# Patient Record
Sex: MALE | Race: WHITE
[De-identification: names, ages, dates, MRNs, and addresses within clinical notes are randomized per-mention and may not be internally consistent; named-entity substitution may affect disease eponyms.]

---

## 2017-09-25 NOTE — EDPHY
H & P


Stated Complaint: Felt sharp pain in back of head last night;now has typical 

migraine





- Personal History


Current Tetanus Diphtheria and Acellular Pertussis (TDAP): Yes





- Medical/Surgical History


Other PMH: depression/anxiety/schizo.  migraines





- Social History


Smoking Status: Current every day smoker


Time Seen by Provider: 09/25/17 19:22


HPI/ROS: 





CHIEF COMPLAINT: migraine-like headache  





HISTORY OF PRESENT ILLNESS:  A 27-year-old male history of chronic intermittent 

headaches describes a doing yoga last evening, was coming out of position when 

he felt sharp stabbing sudden onset left occipital headache , not described 

this really is thunderclap definitely described as sudden onset.  He has been 

experiencing left-sided headache ever since with positive photophobia and 

nausea.  States that it feels definitely different than his usual chronic 

headaches.No gait instability.  No slurred speech.  No head trauma.





PRIMARY CARE PROVIDER:  





REVIEW OF SYSTEMS:


A ten point review of systems was performed and is negative with the exception 

of the items mentioned in the HPI








PAST MEDICAL & SURGICAL  HISTORY:  intermittent chronic headaches





SOCIAL HISTORY: no drug use    











************


PHYSICAL EXAM





(Prior to examination, patient consented to physical exam, hands were washed 

and my usual and customary physical exam procedures followed)


1) GENERAL: Well-developed, well-nourished, alert and oriented.  Appears to be 

in no acute distress.


2) HEAD: Normocephalic, atraumatic


3) HEENT: Pupils equal, round, reactive to light bilaterally.  Sclera 

anicteric.  Nasopharynx, oropharynx, clear, no lesions.  Ears bilaterally with 

normal tympanic membranes.


4) NECK: Full range of motion, no meningeal signs.


5) LUNGS: Clear auscultation bilaterally, no wheezes, no rhonchi, no 

retractions.   


6) HEART: Regular rate and rhythm, no murmur, no heave, no gallop.


7) ABDOMEN: No guarding, no rebound, no focal tenderness, negative McBurney's, 

negative Gongora's, negative Rovsing's, negative peritoneal sign,


8) MUSCULOSKELETAL: Moving all extremities, no focal areas of tenderness, no 

obvious trauma.  No peripheral edema or discoloration.


9) BACK: No CVA tenderness, no midline vertebral tenderness, no fluctuance, no 

step-off, no obvious trauma, no visual or palpable abnormality. 


10) SKIN: No rash, no petechiae. 


11) Psychiatric:  Patient is oriented X 3, there is no agitation.


12) NEURO: Awake, alert, and oriented to person, place and time.  Answers 

questions appropriately.  There were no obvious focal neurologic abnormalities.

  No cerebellar dysfunction.  Cranial nerves 2 through to 12 intact.  Normal 

steady gait.  Upper and lower extremities bilaterally with strength 5 / 5, 

reflexes 2+.








***************





DIFFERENTIAL DIAGNOSIS:  in no particular order, including but not limited to 

subarachnoid hemorrhage, migraine headache, tension headache and infectious 

causes such as meningitis, pharyngitis and sinusitis.  The patient understands 

that this diagnosis is provisional and can never be 100% accurate.  Usual and 

customary warnings were given concerning the clinical impression and all the 

patient's questions were answered.  The patient was instructed to return to the 

emergency department should her symptoms worsen or return, or develop any new 

symptoms, otherwise to followup as directed in discharge instructions. This is 

a partial list of diagnoses considered. These considerations are based on 

history, physical exam, past history and reassessment. (STEPHANIA Willard)


Constitutional: 


 Initial Vital Signs











Temperature (C)  37.1 C   09/25/17 18:52


 


Heart Rate  96   09/25/17 18:52


 


Respiratory Rate  18   09/25/17 18:52


 


Blood Pressure  132/68 H  09/25/17 18:52


 


O2 Sat (%)  96   09/25/17 18:52








 











O2 Delivery Mode               Room Air














Allergies/Adverse Reactions: 


 





No Known Allergies Allergy (Unverified 09/25/17 18:49)


 








Home Medications: 














 Medication  Instructions  Recorded


 


Benztropine Mesylate 0.5 mg PO 09/25/17


 


FLUoxetine [PROzac] 10 mg PO 09/25/17


 


Ranitidine HCl [Zantac] 150 mg PO 09/25/17


 


Topiramate [Topiramate ER] 25 mg PO 09/25/17


 


clonIDINE [Catapres (*)] 0.1 mg PO 09/25/17


 


risperiDONE [RisperDAL] 1 mg PO 09/25/17


 


traZODone [traZODONE 50MG (*)] 50 mg PO 09/25/17














Medical Decision Making


ED Course/Re-evaluation: 





I did not see this patient while he was in the emergency department.  However 

his care was discussed with the PA while the patient was in the department.  I 

agree with treatment plan and management (Itz Pearce)





9:50 p.m.:  Re-evaluation, is feeling improvement, requests dose of analgesia, 

Toradol would be administered.  Discussed his imaging results.  Discussed 

limitations of imaging.  Patient discussed his sudden onset headache, 

possibility of subarachnoid hemorrhage and recommended lumbar puncture.  He 

declines this.  States that he is feeling improvement and primarily want to 

come to the CT scan as he was concerned about underlying pathology.  He is more 

than welcome to return to the emergency department at any point. (STEPHANIA Willard)





- Data Points


Medications Given: 


 








Discontinued Medications





Ketorolac Tromethamine (Toradol)  30 mg IVP EDNOW ONE


   Stop: 09/25/17 21:51


   Last Admin: 09/25/17 22:03 Dose:  30 mg








Departure





- Departure


Disposition: Home, Routine, Self-Care


Clinical Impression: 


 Headache





Condition: Good


Instructions:  Acute Headache (ED)


Additional Instructions: 


THANK YOU FOR YOUR VISIT TO OUR EMERGENCY DEPARTMENT (ED).  YOU WERE SEEN TODAY 

BECAUSE OF A HEADACHE.  YOU MAY HAVE HAD LAB TESTS, A CT SCAN, MRI OR EVEN A 

LUMBAR PUNCTURE (COMMONLY REFERRED TO AS A SPINAL TAP).   WE CANNOT ALWAYS FIND 

THE EXACT CAUSE OF YOUR SYMPTOMS DURING YOUR VISIT TO THE ED.  PLEASE FOLLOW UP 

WITH YOUR DOCTOR WITHIN 24 HOURS TO BE RECHECKED.  RETURN TO THE ED IMMEDIATELY 

IF YOUR HEADACHE WORSENS, IF YOU DEVELOP A FEVER, NECK PAIN OR NECK STIFFNESS, 

OR IF YOU BECOME CONFUSED OR ABNORMALLY DROWSY.


Referrals: 


Baljeet Carnes MD [Medical Doctor] - 2-3 days, call for appt.

## 2019-03-19 ENCOUNTER — HOSPITAL ENCOUNTER (INPATIENT)
Dept: HOSPITAL 80 - FED | Age: 29
LOS: 3 days | Discharge: HOME | DRG: 885 | End: 2019-03-22
Attending: PSYCHIATRY & NEUROLOGY | Admitting: PSYCHIATRY & NEUROLOGY
Payer: COMMERCIAL

## 2019-03-19 DIAGNOSIS — F41.9: ICD-10-CM

## 2019-03-19 DIAGNOSIS — F10.20: ICD-10-CM

## 2019-03-19 DIAGNOSIS — F33.9: Primary | ICD-10-CM

## 2019-03-19 DIAGNOSIS — F17.210: ICD-10-CM

## 2019-03-19 DIAGNOSIS — F43.10: ICD-10-CM

## 2019-03-19 LAB — PLATELET # BLD: 230 10^3/UL (ref 150–400)

## 2019-03-19 PROCEDURE — G0480 DRUG TEST DEF 1-7 CLASSES: HCPCS

## 2019-03-19 NOTE — EDPHY
H & P


Smoking Status: Current every day smoker


Time Seen by Provider: 03/19/19 15:14


HPI/ROS: 





CHIEF COMPLAINT:  Wants mental health evaluation





HISTORY OF PRESENT ILLNESS:  Patient is a 28-year-old male with a history of 

depression and anxiety who presents to the emergency department requesting 

mental health evaluation.  The patient states that he has become more depressed 

recently.  He has no suicidal thoughts.  He denies any overdose.  He has not 

drank significant amounts of alcohol use drugs.  The patient states that he 

went to an outside facility in [place] over the weekend.  He was 

subsequently seen at [facility] yesterday.  He also saw the 

therapist at [facility]. No thoughts of wanteing to harm others.





REVIEW OF SYSTEMS:  


10 systems were reveiwed and are negative with the exception of the elements 

mentioned in the history of present illness. (Abby Bueno)


Past Medical/Surgical History: 





Includes depression, anxiety, migraines, mental health illness





Social history:  The patient drinks alcohol occasionally.  He denies drug use.  

He does not smoke. (Abby Bueno)


Physical Exam: 





Vitals noted


GENERAL:  Well-appearing, in no acute distress, alert.


HEENT:  Eyes normal to inspection, normal pharynx, no signs of dehydration.


NECK:  Normal, supple.


RESPIRATORY:  Clear to auscultation bilaterally, no rales, rhonchi or wheezing.


CVS:  Regular rate and rhythm, no rubs, murmurs, or gallops.


ABDOMEN:  Soft, nontender, nondistended, no organomegaly.


BACK:  Normal to inspection, no CVA tenderness.


SKIN:  Normal color, no rash, warm, dry.  No pallor.


EXTREMITIES:  No pedal edema, no calf tenderness, no Homans sign or cords, no 

joint swelling.


NEURO/PSYCH:  Alert and oriented, normal mood and affect, normal motor sensory 

exam.  No obvious cranial nerve deficit. (Abby Bueno)


Constitutional: 


 Initial Vital Signs











Temperature (C)  37.3 C   03/19/19 15:06


 


Heart Rate  92   03/19/19 15:06


 


Respiratory Rate  17   03/19/19 15:06


 


Blood Pressure  107/76   03/19/19 15:06


 


O2 Sat (%)  95   03/19/19 15:06








 











O2 Delivery Mode               Room Air














Allergies/Adverse Reactions: 


 





No Known Allergies Allergy (Verified 03/19/19 15:05)


 








Home Medications: 














 Medication  Instructions  Recorded


 


FLUoxetine [PROzac] 20 mg PO DAILY 09/25/17


 


Prazosin HCl [Minipress 1mg (*)] 1 mg PO HS 03/19/19














Medical Decision Making


ED Course/Re-evaluation: 





In the emergency department I discussed possible etiologies with the patient.  

I answered all his questions.  I discussed his case with Mental Health 

evaluated.  I do not feel the patient needs laboratory studies.  She agreed to 

see the patient without laboratory studies.





Psychiatric Services evaluated the patient.  They requested the patient be 

given some medicine as a sedative to help him sleep in the emergency 

department.  They then want to re-evaluate him.  They recommend laboratory 

studies be ordered.  These were placed in the system.





1910:  The patient reported to the mental health evaluator that he has 

previously kill people.  Patient also has some delusional type thoughts.  He 

was placed on a mental health hold.





2300:  Patient is signed out to Dr. Vilchis at change of shift (Abby Bueno)


Differential Diagnosis: 





My differential includes but is not limited to depression, anxiety, suicidal 

ideation, schizoaffective disorder, schizophrenia (Abby Bueno)


Other Provider: 





2300 care assumed from Dr. Bueno pending mental health re-evaluation in the 

morning.





2345  patient has been accepted in transfer to [address] 

by Dr. nAne.  I have completed the EMTALA. (Venkata Vilchis)





- Data Points


Laboratory Results: 


 Laboratory Results





 03/19/19 19:20 





 03/19/19 19:20 





 











  03/19/19 03/19/19 03/19/19





  19:20 19:20 19:20


 


WBC      12.32 10^3/uL H 10^3/uL





     (3.80-9.50) 


 


RBC      4.81 10^6/uL 10^6/uL





     (4.40-6.38) 


 


Hgb      15.0 g/dL g/dL





     (13.7-17.5) 


 


Hct      42.2 % %





     (40.0-51.0) 


 


MCV      87.7 fL fL





     (81.5-99.8) 


 


MCH      31.2 pg pg





     (27.9-34.1) 


 


MCHC      35.5 g/dL g/dL





     (32.4-36.7) 


 


RDW      11.8 % %





     (11.5-15.2) 


 


Plt Count      230 10^3/uL 10^3/uL





     (150-400) 


 


MPV      9.1 fL fL





     (8.7-11.7) 


 


Neut % (Auto)      74.6 % H %





     (39.3-74.2) 


 


Lymph % (Auto)      19.0 % %





     (15.0-45.0) 


 


Mono % (Auto)      5.4 % %





     (4.5-13.0) 


 


Eos % (Auto)      0.4 % L %





     (0.6-7.6) 


 


Baso % (Auto)      0.2 % L %





     (0.3-1.7) 


 


Nucleat RBC Rel Count      0.0 % %





     (0.0-0.2) 


 


Absolute Neuts (auto)      9.18 10^3/uL H 10^3/uL





     (1.70-6.50) 


 


Absolute Lymphs (auto)      2.34 10^3/uL 10^3/uL





     (1.00-3.00) 


 


Absolute Monos (auto)      0.67 10^3/uL 10^3/uL





     (0.30-0.80) 


 


Absolute Eos (auto)      0.05 10^3/uL 10^3/uL





     (0.03-0.40) 


 


Absolute Basos (auto)      0.03 10^3/uL 10^3/uL





     (0.02-0.10) 


 


Absolute Nucleated RBC      0.00 10^3/uL 10^3/uL





     (0-0.01) 


 


Immature Gran %      0.4 % %





     (0.0-1.1) 


 


Immature Gran #      0.05 10^3/uL 10^3/uL





     (0.00-0.10) 


 


Sodium    137 mEq/L mEq/L  





    (135-145)  


 


Potassium    3.9 mEq/L mEq/L  





    (3.5-5.2)  


 


Chloride    102 mEq/L mEq/L  





    ()  


 


Carbon Dioxide    26 mEq/l mEq/l  





    (22-31)  


 


Anion Gap    9 mEq/L mEq/L  





    (6-14)  


 


BUN    11 mg/dL mg/dL  





    (7-23)  


 


Creatinine    0.7 mg/dL mg/dL  





    (0.7-1.3)  


 


Estimated GFR    > 60   





    


 


Glucose    114 mg/dL H mg/dL  





    ()  


 


Calcium    9.1 mg/dL mg/dL  





    (8.5-10.4)  


 


Salicylates    < 1.0 mg/dL L mg/dL  





    (2.0-20.0)  


 


Urine Opiates Screen  NEGATIVE     





   (NEGATIVE)   


 


Acetaminophen    < 10 mcg/mL L mcg/mL  





    (10-30)  


 


Urine Barbiturates  NEGATIVE     





   (NEGATIVE)   


 


Ur Phencyclidine Scrn  NEGATIVE     





   (NEGATIVE)   


 


Ur Amphetamine Screen  NEGATIVE     





   (NEGATIVE)   


 


U Benzodiazepines Scrn  NEGATIVE     





   (NEGATIVE)   


 


Urine Cocaine Screen  NEGATIVE     





   (NEGATIVE)   


 


U Marijuana (THC) Screen  NON-NEGATIVE  H     





   (NEGATIVE)   


 


Ethyl Alcohol    < 10 mg/dL mg/dL  





    (0-10)  











Medications Given: 


 








Discontinued Medications





Lorazepam (Ativan Injection)  1 mg IVP EDNOW ONE


   Stop: 03/19/19 16:24


   Last Admin: 03/19/19 16:25 Dose:  Not Given


Lorazepam (Ativan)  1 mg PO EDNOW ONE


   Stop: 03/19/19 16:26


   Last Admin: 03/19/19 16:30 Dose:  1 mg








Departure





- Departure


Disposition: [facility] IP


Clinical Impression: 


 Suicidal ideation





Depression


Qualifiers:


 Depression Type: unspecified Qualified Code(s): F32.9 - Major depressive 

disorder, single episode, unspecified





Condition: Good


Referrals: 


NONE *PRIMARY CARE P,. [Primary Care Provider] - As per Instructions
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
## 2019-03-20 RX ADMIN — OLANZAPINE PRN MG: 5 TABLET, ORALLY DISINTEGRATING ORAL at 01:29

## 2019-03-20 RX ADMIN — PRAZOSIN HYDROCHLORIDE SCH MG: 1 CAPSULE ORAL at 21:58

## 2019-03-20 RX ADMIN — OLANZAPINE PRN MG: 5 TABLET, ORALLY DISINTEGRATING ORAL at 21:58

## 2019-03-20 RX ADMIN — Medication PRN MG: at 21:58

## 2019-03-20 RX ADMIN — ACETAMINOPHEN PRN MG: 325 TABLET ORAL at 12:37

## 2019-03-20 RX ADMIN — NICOTINE SCH MG: 21 PATCH, EXTENDED RELEASE TOPICAL at 13:24

## 2019-03-20 RX ADMIN — Medication PRN MG: at 01:29

## 2019-03-20 RX ADMIN — ACETAMINOPHEN PRN MG: 325 TABLET ORAL at 01:29

## 2019-03-20 RX ADMIN — GUAIFENESIN PRN MG: 100 SOLUTION ORAL at 12:38

## 2019-03-20 RX ADMIN — GUAIFENESIN PRN MG: 100 SOLUTION ORAL at 21:58
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
## 2019-03-20 NOTE — ASMTCMCOM
CM Note

 

CM Note                       

Notes:

CC met with pt. to compete MTP. 



Pt. reports going to [email or web address] in [facility] for assistance but felt it was "too 

triggering". Pt. stated he has been "driving everyone away" adding "unintentionly". Pt. stated he 

is not currently working and has recently been homeless. Pt. stated he has reached out to return to 


his home in [place] that he shares with roommates. Pt. reports being unsure about returning to this 


home, as one of his roommates is dating pt's ex-partner, pt. referred to her as the "love of my 

life". Pt. stated he would like to see a trauma specialist therapist, to get treatment for alcohol 

and to quit smoking. Pt. stated he would like information about AA, and possible some additional 

resources for the future. Pt. stated he has recently lost a lot of weight, adding he is down from 

180lbs to 148lbs. Pt. requested a dietary consult RN notified. Pt. denies any current legal 

issues.  Pt. reports drinking alcohol. "whenever I can" adding he often cannot afford 

it. Pt. reports drinking about once a week. Pt. reports he quit THC last Thursday. Pt. denies all 

other current substance use, pt. reports he was addicted to cocaine and heroin between the ages of 

[age]. 



Pt. presents as alert, bit irritated, somewhat calm, fair eye contact, bit unkempt, and 

cooperative. Staff report pt. sleeping 5.5 hours last evening and being medication complaint. 



CC to ask pt. to sign ROIs to establish follow up appointments. 

 

Date Signed:  03/20/2019 01:01 PM

Electronically Signed By:Gail Campo

## 2019-03-20 NOTE — PDMN
Medical Necessity


Medical necessity: Pt meets inpt criteria per MD order and MCG B-[id number], 

Schizophrenia Spectrum Disorders, Adult: Inpatient Care, 6 days. 29 y/o w/  

admitted w/unspecified schizophrenia spectrum disorder and other psychotic 

disorders, unspecified depressive disorders, on M1 hold due to risk of harm to 

self due to mental illness condition, requires inpt psychiatric hospitalization.

## 2019-03-21 RX ADMIN — Medication PRN MG: at 20:34

## 2019-03-21 RX ADMIN — PRAZOSIN HYDROCHLORIDE SCH MG: 1 CAPSULE ORAL at 20:34

## 2019-03-21 RX ADMIN — OLANZAPINE PRN MG: 5 TABLET, ORALLY DISINTEGRATING ORAL at 20:34

## 2019-03-21 RX ADMIN — GUAIFENESIN PRN MG: 100 SOLUTION ORAL at 08:26

## 2019-03-21 RX ADMIN — NICOTINE SCH MG: 21 PATCH, EXTENDED RELEASE TOPICAL at 08:26

## 2019-03-21 RX ADMIN — ACETAMINOPHEN PRN MG: 325 TABLET ORAL at 08:26

## 2019-03-21 NOTE — ASMTBHDC
Notes

 

Note:                         

Notes:

Soledad from [facility] called. Per Soledad [facility] does not have on going MH services. Soledad recommended 

ct. be referred to providers in the community. Soledad suggested a referral to [facility]. Let Soledad know that 


ct. has commercial insurance and would not get psychiatric care from [facility] only therapy if he so 

choose. Soledad refed ct. to [facility] conduct officer ([phone]) for a 

re-entry meeting. [facility] is in spring break all of next week and there are no student services 

until April 1st.

 

Date Signed:  03/21/2019 02:57 PM

Electronically Signed By:Soledad Black

## 2019-03-21 NOTE — ASMTCMCOM
CM Note

 

CM Note                       

Notes:

CC checked in with ct. who presented as very anxious and depressed. When asked about his mood 

ct. responded "living, my mood is poor but better then it was". Ct. reported seeing a therapist at 

[facility] and he signed a CHASIDY for her. He agreed to schedule a follow up appointment prior to 

discharge. Ct. reported that he has no support system. Parents live in [place] but are not aware 

of ct.'s admission. He refused to sign CHASIDY for them.

 

Date Signed:  03/21/2019 09:05 AM

Electronically Signed By:Soledad Black

## 2019-03-21 NOTE — SOAPPROG
SOAP Progress Note


Assessment/Plan: 


Assessment:





Major Depressive Disorder, with anxious distress, PTSD, ETOH abuse, Nicotine 

dependence.  Slight improvement noted. (see subjective/objective note).  

Patient could benefit from continued inpatient hospitalization for crisis 

stabilization, safety, and medication evaluation.   











Plan:





1. Psychotropic medications: After reviewing options, risks, and benefits 

patient agrees to continue current medications.  No other medication changes at 

this time as more time is needed to determine ongoing tolerability and 

efficacy.  Plan is to continue to observe patient for response and side effects 

from medications, and ongoing monitoring and evaluation.  


2. Review with patient informed consent and recommendations for psychotropic 

medication treatment listed below


3. Labs: no additional labs at this time


4. Therapy: continue milieu and group therapy  


5. Further investigation including gathering information from patients 

relatives and review of past case records to inform treatment plan.


6. Safety/Wellness plan and follow-up outpatient appointments to be established 

prior to discharge.  Next steps are for patient to meet with care manager to 

plan a safe discharge plan and establish outpatient services for ongoing 

treatment.  


7. Confer with inpatient treatment team regarding treatment plan.  


8. Psychosocial stressors addressed through .


9. Legal status: M1


10. Consider discharge next week if patient is in stable condition, safe, and 

has a safe discharge plan.   


11. Substance abuse interventions: alcohol and nicotine





PSYCHOTROPIC MEDICATION TREATMENT INFORMED CONSENT and RECOMMENDATIONS: Review 

nature of condition, diagnosis, and prognosis.  Review nature and purpose of 

psychotropic medication treatment.  Review type of psychotropic medications 

being ordered.  Review risk and benefits of psychotropic medication treatment.  

Review probable length of time patient will need to take medications.  Review 

risk and benefits of not undergoing psychotropic medication treatment.  Review 

alternative treatments to psychotropic medications.  Review psychotropic 

medications contraindications, drug-drug interactions, side effects, and 

importance of reporting any side effects to a psychiatric provider or nurse 

during inpatient hospitalization, and upon discharge to patients psychiatric 

outpatient provider, primary care provider, or other health care professional.  

Review importance of asking a nurse, psychiatric provider, or primary care 

provider any questions or problems concerning the psychotropic medications.  

Verify patient understands the information that has been provided, and 

understands, accepts, and agrees to psychotropic medications.  





Review patients safety plan and importance of patient to report to staff while 

hospitalized if patient is ever a danger to self/others, or unable to care for 

self, and upon discharge, the importance for patient to contact [facility] or [facility], or go to the nearest emergency room, if patient is ever a 

danger to self/others, or unable to care for self.  Recommend that upon 

discharge patient establish medication management treatment with a psychiatric 

provider, establishes routine therapy appointments, and follow-up with primary 

care provider.  Verify patient understands and agrees to these recommendations.











03/21/19 11:27





Subjective: 


Following up with patient for evaluation of depression, anxiety, and safety.  

Patient reports, "Feeling a little better, got some sleep finally."  Patient 

expresses the following psychiatric symptoms moderate anxiety.  Patient reports 

taking medications as prescribed, and describes response to medications as fair

; reports able to sleep better last night, reports no night terrors/nightmares.

  Patient does not report undesirable side effects from the medications, and 

agrees to continue current medications.  Patient requests assistance with ETOH 

abuse treatment and nicotine cessation.  Patient expresses interest in 

following up on outpatient basis for treatment and for PTSD treatment.   





Objective: 





 Vital Signs











Temp Pulse Resp BP Pulse Ox


 


 36.3 C   86   20   106/65   96 


 


 03/21/19 06:00  03/21/19 06:00  03/21/19 06:00  03/21/19 06:00  03/21/19 06:00








NURSING REPORT: Consulted with nursing for update on patients progress in 

treatment.  Nurses report patient is engaged in treatment, is attending groups, 

slept 8 hours, expresses the following psychiatric symptoms: moderate, exhibits 

the following psychiatric symptoms: anxious, is eating all meals, is agreeable 

to medications and taking as prescribed with no report of side effects, with no 

s/s of EPS/akathisia, and denies SI/HI, denies A/V hallucinations, and denies 

delusions.








CASE COORDINATOR UPDATE: Establish follow up for ongoing treatment on 

outpatient basis. 








MSE: The patient is an under-nourished male looking older than chronological 

age.  Attire is appropriate and dress is hospital garb.  Grooming status is 

inappropriate and disheveled.  Ambulation is independent.  Gait is normal and 

coordinated.  Posture is normal and relaxed.  Eye contact is appropriate.  

Motor activity is appropriate with purposeful, organized, coordinated movements

; with no involuntary movements.  Attitude is cooperative.  Patient appears 

attentive and relates well to this interviewer.  Language production is 

spontaneous.  R/R/V normal.  Articulation is clear.  Patient reports mood as 

okay with congruent and appropriate affect.  Patients thought process is 

improved; fairly linear and logical.  Patient denies suicidal thoughts, denies 

homicidal ideation.  Patient denies auditory, visual hallucinations.  Patient 

denies delusions.  Patient does not appear to be attending to internal stimuli.

  Patients attention and concentration are fair.  Patient is oriented to person

, place.  Patients insight and judgment are fair.  








SUBSTANCE ABUSE BRIEF INTERVENTION: Brief intervention regarding the risks of 

alcohol and nicotine abuse is provided to patient with goal to reduce the risk 

of harm that could result from the continued use of alcohol and nicotine, with 

the general aim to investigate the problem, raise awareness of problem, develop 

a solution with the patient, recommend a specific change or activity, and 

motivate the patient toward change.  Assess substance abuse behavior and give 

supportive advice about harm reduction, recommend a reduction in hazardous/at-

risk consumption patterns, and facilitate referrals for additional specialized 

treatment with care coordinator.  Intermediate goal is for the patient to quit 

and attend outpatient substance abuse treatment.  Intervention focus on 

intermediate goals to allow for more immediate success in the treatment process 

to keep the patient motivated.  Review following with patient: Cannabis use 

risks: Short-term use: impaired short-term memory, impaired motor coordination, 

altered judgement, in high doses paranoia and psychosis.  Long-term use 

addiction, diminished life satisfaction and achievement, symptoms of chronic 

bronchitis, and increased risk of chronic psychosis disorders if predisposition 

to such disorders.  In withdrawal anger, aggression irritability, anxiety and 

nervousness, decreased appetite or weight loss, restlessness, and sleep 

difficulties with strange dreams.  Alcohol/Binge Drinking risks: short-term: 

injuries, violence, alcohol poisoning, risky sexual behaviors.  Long-term: high 

blood pressure, stroke, liver disease, digestive problems, cancer, learning and 

memory problems, depression and anxiety, social problems, and alcohol 

dependence.  Nicotine dependence: lung cancer, other cancers, heart and 

circulatory system problems, diabetes, eye problems, infertility and impotence, 

more prone to respiratory infections, weakened senses, teeth and gum disease, 

premature aging, second hand smoke.  Withdrawal symptoms include strong cravings

, anxiety, irritability, restlessness, difficulty concentrating, depressed mood

, frustration, anger, increased hunger, insomnia, and constipation or diarrhea.








OUTPATIENT SUBSTANCE ABUSE TREATMENT: Patient referred to outpatient provider 

and treatment for continued treatment related to substance abuse.











- Time Spent With Patient


Time Spent With Patient: 


25 minutes, met with patient individually and with patient and treatment team.








- Pending Discharge


Pending Discharge Within 24 Hours: No


Pending Discharge Within 48 Hours: No





ICD10 Worksheet


Patient Problems: 


 Problems











Problem Status Onset


 


Depression Acute  


 


Suicidal ideation Acute

## 2019-03-22 VITALS — DIASTOLIC BLOOD PRESSURE: 61 MMHG | SYSTOLIC BLOOD PRESSURE: 107 MMHG

## 2019-03-22 RX ADMIN — NICOTINE SCH MG: 21 PATCH, EXTENDED RELEASE TOPICAL at 08:24

## 2019-03-22 NOTE — BDS
[f 
rep st]



                                                  BEHAVIORAL HEALTH DISCHARGE 
SUMMARY





REASON FOR ADMISSION:  From the ED note dated 03/19/2019, patient with a 
history of depression and anxiety presented to the emergency department.  The 
patient reported becoming more depressed recently.  The patient was admitted 
involuntarily on an M1 hold due to being a danger to himself.  The patient was 
admitted for safety, crisis stabilization, and medication evaluation.



ADMITTING DIAGNOSES:  

1.  Major depressive disorder, recurrent episode, severe, with anxious distress.

2.  Posttraumatic stress disorder.  

3.  Alcohol use disorder, severe dependence. 

4.  Nicotine dependence.



ADMISSION PHYSICAL EXAM:  Patient was seen for history and physical 
consultation on 03/20/2019 for medical clearance for inpatient psychiatric 
hospitalization and treatment.  The patient was medically cleared for inpatient 
psychiatric hospitalization treatment.  For further details, please refer to 
consultation document dated 03/20/2019.



ADMISSION LABS:  

1.  CBC within normal limits except white blood cells were elevated at 12.32, 
neutrophils elevated at 74.6, eosinophils low at 0.4, basophils low at 0.2, 
absolute neutrophils elevated at 9.18.  

2.  BMP within normal limits except glucose was elevated at 114.  A1c within 
normal limits at 5.2.  

3.  Liver function within normal limits.

4.  Lipid panel within normal limits except cholesterol was low at 105, LDL 
cholesterol calculated low at 57, non-HDL cholesterol low at 71, and HDL 
cholesterol low at 34.

5.  Toxicology screen non-negative for THC, negative for the other substances 
screened, and negative for ethyl alcohol.



MAJOR PROCEDURES OR TESTS:  None.



HOSPITAL COURSE:  The most prominent symptoms and behaviors while the patient 
was here were reports of severe anxiety and depression.  Treatment modalities 
utilized were milieu and group therapy.  Prozac 20 mg p.o. daily was continued 
to target mood symptoms, was tolerated with no report of side effects.  
Prazosin 1 mg p.o. at bedtime was started to target nightmares due to 
posttraumatic stress disorder.  Medication was tolerated with no report of side 
effects and with good response.  Patient has improved considerably with no 
signs of psychiatric symptoms and no psychiatric symptoms expressed.  Patient 
reports he has improved since admission, states to be in stable condition, 
feels safe to discharge, and he contracts for safety.  Patients response to 
treatment was good.  There were no adverse or unexpected results of treatment.  
The patient was safe throughout stay, active in treatment, engaged in groups, 
and was appropriate with staff.  Patient met with treatment team prior to 
discharge to assess readiness to discharge and review discharge plan.  The 
treatment team consensus is the patient in stable condition, has a safe 
discharge plan, and is ready to discharge today.  



CONDITION AT DISCHARGE:  Patient is in stable condition and is no longer a 
danger to self or others, and is not gravely disabled due to mental illness.  
Patient is no longer in need of inpatient level of care, and can be safely and 
effectively treated within the community.  The patients level of risk at time 
of discharge is low.  MSE: The patient is casually dressed and with good hygiene
, and looks stated age.  Patient is sitting, posture is upright, and position 
is relaxed.  Patient appears awake, alert, and responds appropriately and 
reasonably during interview.  Patient is engaged, relates well to interviewer, 
and emotional facial expression is appropriate to situation and changes 
appropriately with topic.  Patient is cooperative, makes comfortable eye contact
, and movements are voluntary, deliberate, coordinated, and smooth and even 
with no inappropriate movements.  Patient makes laryngeal sounds effortlessly 
and shares conversation appropriately; pace of conversation is appropriate, and 
stream of talking is fluent; articulation is clear and understandable; word 
choice is effortless and appropriate for education level; completes sentences, 
occasionally pausing to think; rate and volume are appropriate for interview 
and setting.  Patient reports mood as euthymic.  Patients affect is stable with 
full variable range, congruent with mood, and appropriate to speech and 
circumstances.  Patient has linear and logical thinking, with no loose 
associations, tangential thought, thought blocking, concrete thinking, or any 
other signs of formal thought disorder.  Patient denies suicidal and homicidal 
ideation, and denies hallucinations and delusions.  Patient appears to be a 
reliable historian with sound judgement and good insight into current 
condition.  Patient has no apparent dysfunction in recent or remote memory noted
, and no evidence of gross cognitive dysfunction noted at any point during the 
interview.



DISCHARGE DIAGNOSES:  

1.  Major depressive disorder, recurrent episode, severe, with anxious distress.

2.  Posttraumatic stress disorder.  

3.  Alcohol use disorder, severe dependence. 

4.  Nicotine dependence.



CURRENT MEDICATIONS:  After reviewing options, risks, and benefits with the 
patient, the patient agrees to continue:

1.  Prozac 20 mg p.o. daily. 

2.  Prazosin 1 mg p.o. at bedtime. 



The patient requests prescriptions for these medications at time of discharge.  
Prescriptions for 30 days are provided.  The prescriptions are reviewed with 
the patient at time of discharge to ensure accuracy and patient understanding.



DISPOSITION:  Patient left hospital independently and voluntarily.



FOLLOWUP:  Care coordinator reports the appropriate outpatient follow-up 
services have been established and outpatient appointments have been scheduled.
  The patient received written instructions with times and dates of outpatient 
follow-up appointments.  The following follow-up recommendations were provided 
to the patient at discharge: Continue psychotropic medications as prescribed 
and attend appointments as scheduled.  Report any side effects to a psychiatric 
outpatient provider, a primary care provider, or other health care 
professional.  Address any questions or problems concerning the psychotropic 
medications with a psychiatric outpatient provider, a primary care provider, or 
other health care professional.  Contact [facility] or [facility], or go 
to the nearest emergency room, if you are ever a danger to yourself/others, or 
unable to care for yourself.  As soon as possible, establish a routine 
medication management treatment with a psychiatric provider, establish routine 
therapy appointments, and follow-up with a primary care provider.  



SUBSTANCE ABUSE BRIEF INTERVENTION: Brief intervention regarding the risks of 
alcohol and nicotine abuse is provided to patient with goal to reduce the risk 
of harm that could result from the continued use of alcohol and nicotine, with 
the general aim to investigate the problem, raise awareness of problem, develop 
a solution with the patient, recommend a specific change or activity, and 
motivate the patient toward change.  Assess substance abuse behavior and give 
supportive advice about harm reduction, recommend a reduction in hazardous/at-
risk consumption patterns, and facilitate referrals for additional specialized 
treatment with care coordinator.  Intermediate goal is for the patient to quit 
and attend outpatient substance abuse treatment.  Intervention focus on 
intermediate goals to allow for more immediate success in the treatment process 
to keep the patient motivated.  Review following with patient: Cannabis use 
risks: Short-term use: impaired short-term memory, impaired motor coordination, 
altered judgement, in high doses paranoia and psychosis.  Long-term use 
addiction, diminished life satisfaction and achievement, symptoms of chronic 
bronchitis, and increased risk of chronic psychosis disorders if predisposition 
to such disorders.  In withdrawal anger, aggression irritability, anxiety and 
nervousness, decreased appetite or weight loss, restlessness, and sleep 
difficulties with strange dreams.  Alcohol/Binge Drinking risks: short-term: 
injuries, violence, alcohol poisoning, risky sexual behaviors.  Long-term: high 
blood pressure, stroke, liver disease, digestive problems, cancer, learning and 
memory problems, depression and anxiety, social problems, and alcohol 
dependence.  Nicotine dependence: lung cancer, other cancers, heart and 
circulatory system problems, diabetes, eye problems, infertility and impotence, 
more prone to respiratory infections, weakened senses, teeth and gum disease, 
premature aging, second hand smoke.  Withdrawal symptoms include strong cravings
, anxiety, irritability, restlessness, difficulty concentrating, depressed mood
, frustration, anger, increased hunger, insomnia, and constipation or diarrhea.



OUTPATIENT SUBSTANCE ABUSE TREATMENT: Patient referred to outpatient provider 
and treatment for continued treatment related to substance abuse.



LEGAL COURSE:  The patient was admitted on an M1 hold for involuntary inpatient 
psychiatric hospitalization and treatment.  The patient discharged today 
independently and voluntarily.



ATTITUDE AT TIME OF DISCHARGE:  The patients attitude was positive at time of 
discharge, and patient reports looking forward to discharging today.  The 
patient reports he feels safe to discharge, is no longer a danger to himself or 
others, is in stable condition, and contracts for safety.  Patient states he 
will continue medications as prescribed, and establish medication management 
treatment with an outpatient provider after discharge.  Patient reports he 
understands the information that has been provided to him, and he understands, 
accepts, and agrees to psychotropic medications.  Patient describes internal 
protective factors as the coping skills he has learned while hospitalized here, 
and he plans to continue to practice these coping skills after discharge.  



FAMILY MEETING: This NP and patient calls patient's roommate to speak to him 
about patient returning to live with him.  Roommate states he is okay with this 
plan, and communicates no concerns regarding patient returning to their shared 
living space.



LABS AND RADIOLOGY STUDIES:  There were no pending labs or studies at time of 
discharge.



ADVANCE DIRECTIVES:  There were no advance directives on file, and patient was 
full code during this hospitalization.



The following psychotropic medication treatment informed consent and 
recommendations were provided to the patient at time of discharge.  Patient 
reports he understands, accepts, and agrees to the information that has been 
provided.   



PSYCHOTROPIC MEDICATION TREATMENT INFORMED CONSENT and RECOMMENDATIONS: Review 
nature of condition, diagnosis, and prognosis.  Review nature and purpose of 
psychotropic medication treatment. Review type of psychotropic medications 
being prescribed.  Review risk and benefits of psychotropic medication 
treatment.  Review probable length of time will need to take medications.  
Review risk and benefits of not undergoing psychotropic medication treatment.  
Review alternative treatments to psychotropic medications.  Review psychotropic 
medications contraindications, side effects, and importance of reporting any 
side effects to a psychiatric provider, primary care provider, or other health 
care professional.  Review importance of asking a psychiatric provider or 
primary care provider any questions or problems concerning the psychotropic 
medications.  



Review safety plan and the importance to contact [facility] or [facility]
, or go to the nearest emergency room, if ever a danger to yourself/others, or 
unable to care for yourself.  Recommend upon discharge to establish routine 
medication management treatment with a psychiatric provider, establish routine 
therapy appointments, and follow-up with a primary care provider.  Verify 
patient understands, accepts, and agrees to the information that has been 
provided. 







Job #:  [id number]

MTDD

## 2020-04-22 NOTE — ASMTTCLDSP
[facility] Discharge Disposition

 

Disposition:                  Answers:  Admit                                 

Discharge                     

Concerns/Recommendations:     

Notes:

In consultation with [facility] ED physician Abby Bueno MD and on-call psychiatrist, Marisabel Anne MD, both concurred that pt appears to meet 27-65 criteria requiring psychiatric 

hospitalization as pt appears to be at risk of harm to self due to a mental illness condition. Pt 

was read the Patient Rights and Responsibilities Statement on 3/19/2019 20:00, original placed on 

chart, and was given photocopy of Rights. Pt signed the Patient Rights. Pt was given the 3[facility] 

prohibited belongings list while in the ED.

For inpatient                 Marisabel Anne MD

admission, the following      

psychiatrist agreed to        

accept patient for            

admission to Behavioral       

Health ([facility]):              

 

Date Signed:  03/19/2019 09:01 PM

Electronically Signed By:Miriam Hoyos
Yes